# Patient Record
Sex: FEMALE | Race: BLACK OR AFRICAN AMERICAN | Employment: FULL TIME | URBAN - METROPOLITAN AREA
[De-identification: names, ages, dates, MRNs, and addresses within clinical notes are randomized per-mention and may not be internally consistent; named-entity substitution may affect disease eponyms.]

---

## 2024-04-11 VITALS
WEIGHT: 259.25 LBS | HEIGHT: 69 IN | DIASTOLIC BLOOD PRESSURE: 85 MMHG | SYSTOLIC BLOOD PRESSURE: 125 MMHG | TEMPERATURE: 98 F | HEART RATE: 87 BPM | RESPIRATION RATE: 18 BRPM | BODY MASS INDEX: 38.4 KG/M2 | OXYGEN SATURATION: 99 %

## 2024-04-11 PROCEDURE — 99283 EMERGENCY DEPT VISIT LOW MDM: CPT

## 2024-04-11 PROCEDURE — 99284 EMERGENCY DEPT VISIT MOD MDM: CPT

## 2024-04-12 ENCOUNTER — HOSPITAL ENCOUNTER (EMERGENCY)
Facility: HOSPITAL | Age: 29
Discharge: HOME | End: 2024-04-12
Attending: EMERGENCY MEDICINE
Payer: MEDICAID

## 2024-04-12 DIAGNOSIS — R21 RASH AND NONSPECIFIC SKIN ERUPTION: Primary | ICD-10-CM

## 2024-04-12 RX ORDER — PREDNISONE 20 MG/1
40 TABLET ORAL DAILY
Qty: 10 TABLET | Refills: 0 | Status: SHIPPED | OUTPATIENT
Start: 2024-04-12 | End: 2024-04-17

## 2024-04-12 RX ORDER — FAMOTIDINE 20 MG/1
20 TABLET, FILM COATED ORAL ONCE
Status: COMPLETED | OUTPATIENT
Start: 2024-04-12 | End: 2024-04-12

## 2024-04-12 RX ORDER — DIPHENHYDRAMINE HCL 25 MG
25 CAPSULE ORAL ONCE
Status: COMPLETED | OUTPATIENT
Start: 2024-04-12 | End: 2024-04-12

## 2024-04-12 RX ORDER — PREDNISONE 20 MG/1
40 TABLET ORAL ONCE
Status: COMPLETED | OUTPATIENT
Start: 2024-04-12 | End: 2024-04-12

## 2024-04-12 RX ORDER — FAMOTIDINE 40 MG/1
20 TABLET, FILM COATED ORAL 2 TIMES DAILY
Qty: 7 TABLET | Refills: 0 | Status: SHIPPED | OUTPATIENT
Start: 2024-04-12 | End: 2024-04-19

## 2024-04-12 RX ORDER — DIPHENHYDRAMINE HCL 25 MG
25 CAPSULE ORAL EVERY 6 HOURS PRN
Qty: 20 CAPSULE | Refills: 0 | Status: SHIPPED | OUTPATIENT
Start: 2024-04-12 | End: 2024-04-19

## 2024-04-12 NOTE — ED PROVIDER NOTES
Patient Seen in: [facility] Emergency Department      History   No chief complaint on file.    Stated Complaint: Rash    Subjective:   HPI  28-year-old female presents for evaluation of rash.  1 week ago she noticed an itchy patch on her left arm.  She purchased an antifungal ringworm treatment from [facility] and has been using that ointment.  However over the past couple of days she notes similarly itchy spots develop over the chest, torso and back.  They are itchy.  No fluid-filled areas.  No fevers.  No intraoral lesions.  Distant travel.  No sick contacts but she does work at a nursing home.  She took a few days of Bactrim last month for UTI.  No other new exposures.      Objective:   No pertinent past medical history.            No pertinent past surgical history.              No pertinent social history.            Review of Systems    Positive for stated complaint: Rash  Other systems are as noted in HPI.  Constitutional and vital signs reviewed.      All other systems reviewed and negative except as noted above.    Physical Exam     ED Triage Vitals [04/11/24 2304]   /85   Pulse 87   Resp 18   Temp 98 °F (36.7 °C)   Temp src Oral   SpO2 99 %   O2 Device        Current:/85   Pulse 87   Temp 98 °F (36.7 °C) (Oral)   Resp 18   Ht 175.3 cm (5' 9\")   Wt 117.6 kg   LMP 04/01/2024 (Approximate)   SpO2 99%   BMI 38.29 kg/m²         Physical Exam  Vitals and nursing note reviewed.   Constitutional:       Appearance: She is well-developed.   HENT:      Head: Normocephalic and atraumatic.   Eyes:      Extraocular Movements: Extraocular movements intact.   Cardiovascular:      Rate and Rhythm: Normal rate and regular rhythm.      Heart sounds: Normal heart sounds.   Pulmonary:      Effort: Pulmonary effort is normal.      Breath sounds: Normal breath sounds.   Abdominal:      General: There is no distension.      Palpations: Abdomen is soft.      Tenderness: There is no abdominal tenderness.    Musculoskeletal:         General: Normal range of motion.      Cervical back: Normal range of motion.   Skin:     General: Skin is warm.      Comments: Scattered papular skin colored to darker brown subcentimeter circular and ovoid lesions scattered across the chest, mid back and torso.  The papular lesion at the posterior left arm is slightly erythematous with overlying excoriation.  No vesicular lesions.  No areas of confluence or warmth.   Neurological:      Mental Status: She is alert.      Comments: No focal deficits       Differential diagnosis includes but is not limited to irritant dermatitis, viral exanthem, less likely fungal etiology,        ED Course   Labs Reviewed - No data to display                   MDM                  Medical Decision Making  Patient is well-appearing and ambulatory without systemic symptoms of illness or infection.  Discussed treatment with short course of Benadryl, famotidine and prednisone for symptomatic relief.  Also advise follow-up with PCP for reevaluation and she is agreeable with plan.    Problems Addressed:  Rash and nonspecific skin eruption: acute illness or injury with systemic symptoms    Risk  Prescription drug management.        Disposition and Plan     Clinical Impression:  1. Rash and nonspecific skin eruption         Disposition:  Discharge  4/12/2024  1:22 am    Follow-up:  Use the resources provided to you for PCP follow-up    Follow up            Medications Prescribed:  Current Discharge Medication List        START taking these medications    Details   famotidine (PEPCID) 40 MG Oral Tab Take 0.5 tablets (20 mg total) by mouth 2 (two) times daily for 7 days.  Qty: 7 tablet, Refills: 0      predniSONE 20 MG Oral Tab Take 2 tablets (40 mg total) by mouth daily for 5 days.  Qty: 10 tablet, Refills: 0      diphenhydrAMINE 25 MG Oral Cap Take 1 capsule (25 mg total) by mouth every 6 (six) hours as needed for Itching.  Qty: 20 capsule, Refills: 0

## 2024-04-12 NOTE — CM/SW NOTE
0107:  Dr. Aguilar called stating patient in need of referral to [facility] for PCP as patient does not have insurance.    0133:  Patient has [facility] Medicaid. [facility] informed Dr. Aguilar of this and that this ERCM will bring over list of in-network PCP's within pt's [place] [employer] shortly. Dr. Aguilar v/u.    0141:  Printed 41 pages off [employer] [employer] website of in-network PCP's within 5 miles radius of pt's address and provided the above list to patient. Pt v/u.

## 2024-04-12 NOTE — ED INITIAL ASSESSMENT (HPI)
Pt here with rash to stomach and legs that started today. Per patient took medication for UTI in beginning of march that caused dark spots underneath breasts and she stopped taking medication. No new intake since then. No allergy medication taken.

## 2024-04-12 NOTE — DISCHARGE INSTRUCTIONS
Take the medications as prescribed.  Do not drive or operate heavy machinery while taking Benadryl as it can make you drowsy.  Please follow-up with your PCP for reevaluation.